# Patient Record
Sex: FEMALE | Race: BLACK OR AFRICAN AMERICAN | Employment: PART TIME | URBAN - NONMETROPOLITAN AREA
[De-identification: names, ages, dates, MRNs, and addresses within clinical notes are randomized per-mention and may not be internally consistent; named-entity substitution may affect disease eponyms.]

---

## 2019-03-12 ENCOUNTER — PROCEDURE VISIT (OUTPATIENT)
Dept: NEUROLOGY | Age: 49
End: 2019-03-12
Payer: COMMERCIAL

## 2019-03-12 DIAGNOSIS — R20.0 BILATERAL HAND NUMBNESS: Primary | ICD-10-CM

## 2019-03-12 DIAGNOSIS — M54.12 CERVICAL RADICULOPATHY: ICD-10-CM

## 2019-03-12 DIAGNOSIS — G56.03 BILATERAL CARPAL TUNNEL SYNDROME: ICD-10-CM

## 2019-03-12 PROCEDURE — 95911 NRV CNDJ TEST 9-10 STUDIES: CPT | Performed by: PSYCHIATRY & NEUROLOGY

## 2019-03-12 PROCEDURE — 95886 MUSC TEST DONE W/N TEST COMP: CPT | Performed by: PSYCHIATRY & NEUROLOGY

## 2020-07-23 ENCOUNTER — VIRTUAL VISIT (OUTPATIENT)
Dept: PSYCHIATRY | Age: 50
End: 2020-07-23
Payer: COMMERCIAL

## 2020-07-23 PROCEDURE — 99203 OFFICE O/P NEW LOW 30 MIN: CPT | Performed by: PHYSICIAN ASSISTANT

## 2020-07-23 PROCEDURE — G8427 DOCREV CUR MEDS BY ELIG CLIN: HCPCS | Performed by: PHYSICIAN ASSISTANT

## 2020-07-23 PROCEDURE — 3017F COLORECTAL CA SCREEN DOC REV: CPT | Performed by: PHYSICIAN ASSISTANT

## 2020-07-23 RX ORDER — ROSUVASTATIN CALCIUM 20 MG/1
1 TABLET, COATED ORAL DAILY
COMMUNITY
Start: 2020-06-03

## 2020-07-23 RX ORDER — CITALOPRAM 20 MG/1
1 TABLET ORAL DAILY
COMMUNITY
Start: 2020-05-27 | End: 2020-08-20 | Stop reason: SDUPTHER

## 2020-07-23 RX ORDER — IBUPROFEN 800 MG/1
1 TABLET ORAL 3 TIMES DAILY PRN
COMMUNITY
Start: 2020-07-08

## 2020-07-23 RX ORDER — AMLODIPINE BESYLATE AND BENAZEPRIL HYDROCHLORIDE 5; 10 MG/1; MG/1
1 CAPSULE ORAL DAILY
COMMUNITY
Start: 2020-06-03

## 2020-07-23 RX ORDER — HYDROXYZINE HYDROCHLORIDE 10 MG/1
1 TABLET, FILM COATED ORAL 3 TIMES DAILY PRN
COMMUNITY
Start: 2020-06-03 | End: 2020-07-23 | Stop reason: SDUPTHER

## 2020-07-23 RX ORDER — ACETAMINOPHEN/DIPHENHYDRAMINE 500MG-25MG
1 TABLET ORAL DAILY
COMMUNITY
Start: 2020-06-03 | End: 2020-08-08 | Stop reason: ALTCHOICE

## 2020-07-23 RX ORDER — HYDROXYZINE HYDROCHLORIDE 10 MG/1
TABLET, FILM COATED ORAL
Qty: 180 TABLET | Refills: 2 | Status: SHIPPED | OUTPATIENT
Start: 2020-07-23 | End: 2020-08-20 | Stop reason: SDUPTHER

## 2020-07-23 RX ORDER — MECLIZINE HYDROCHLORIDE 25 MG/1
1 TABLET ORAL DAILY PRN
COMMUNITY
Start: 2019-09-28

## 2020-07-23 NOTE — PROGRESS NOTES
[facility]  Psychiatric Assessment       CHIEF COMPLAINT:  Worsening anxiety    History obtained from:  patient, electronic medical record    HISTORY OF PRESENT ILLNESS:    Jcarlos Jason is a 47 y.o. female with significant history of anxiety and depression who presents via telehealth video as a new patient to establish care. She reports she has been seeing Radha Lexi for some years now. She states last year, two of his sisters passed away within a month. She states she was going back and forth seeing her sisters while they were in the hospital and had a mini stroke. She states she has been staying with her daughter until she can get a place. She was living with her son in an apartment but it was too stressful for her so she moved out. She has also been off work due to [employer] but is going back soon. She states the beginning of last year was when she first noticed her anxiety was a problem. She states it has gotten progressively worse the past year. She is constantly worrying about her family, especially her daughter and 4 grandchildren whom she lives with. She feels like her mind races when she is not busy. She states she started having panic attacks the beginning of last year. She reports she has experienced a lot of loss in her family and thinking about that triggers her panic attacks. She has not had one in about a month and a half. During her panic attacks, she feels that her heart is jumping like she is having a heart attack. She reports everything tightens up and when she breathes she has to be hunched over. She feels its hard to move, like her legs are sand. She reports that her panic attacks last for minutes. She states when she has a panic attack she takes a Vistaril and breathes into a bag which helps her calm down within a few minutes. She reports she has been having trouble sleeping due to her sleep apnea. She feels constantly on edge \"when I am bored. \" She has been having more irritability recently. She feels she holds a lot of tension in her neck. She also states that when she wakes up she feels that she was clenching her jaw all night. She thinks her depression really started when \"I wanted out of a relationship with my boyfriend and my son. That's when I moved in with my daughter. That been probably about last year in June. \" She feels her depression has gotten better since she has moved in with her daughter. She states she is scared to go to sleep due to her sleep apnea. She reports she gets about 4 hours a night. She has trouble staying asleep. She states she takes a few power naps throughout the day. She reports sometimes she feels rested but most of the time she is tired in the morning when she wakes up. She states her appetite has been good. She states she gained 10 pounds within a few months this year. She reports her energy and motivation is good because her grandbabies keep her going. She states her attention and concentration has been good. She denies feelings of hopelessness and helplessness. She has been seeing Arnel Purvis, her PCP, for psychotropic medication management. Is currently on Celexa 20mg and Hydroxyzine 10mg TID PRN for anxiety. Reports good medication compliance. Denies side effects. Denies past suicide attempts and inpatient psychiatric admissions.  Denies substance use      PSYCHIATRIC HISTORY:      Outpatient treatment: Arnel Purvis, PCP for psychotropic medication management  Suicide Attempts: denies  Inpatient Psychiatric Admission: denies    Past Psychiatric Meds:     Celexa  Hydroxyzine  Zoloft    Adverse reactions from psychotropic medications:      Denies      Lifetime Psychiatric Review of Systems     ·    Obsessions and Compulsions: no    ·    Grace or Hypomania: no  ·    Hallucinations: no  ·    Panic Attacks:  yes - heart racing, feels like shes having a heart attack, unable to move, trouble breathing  ·    Delusions: no  ·    Phobias: no   ·    Trauma: denies       Past Medical History:        Diagnosis Date    Asthma     Bronchitis     GERD (gastroesophageal reflux disease)     Headache     Hypertension     no treatment at present       Past Surgical History:        Procedure Laterality Date    CARPAL TUNNEL RELEASE       SECTION  3213,9924    COLONOSCOPY  2016    HYSTERECTOMY      TUBAL LIGATION      UPPER GASTROINTESTINAL ENDOSCOPY  2016    WISDOM TOOTH EXTRACTION         Current Meds    Current Outpatient Medications:     sertraline (ZOLOFT) 25 MG tablet, Take 25 mg by mouth daily, Disp: , Rfl:     lisinopril (PRINIVIL;ZESTRIL) 5 MG tablet, Take 5 mg by mouth daily, Disp: , Rfl:      Allergies:  Patient has no known allergies. Social History:     TOBACCO: Denies   ETOH:  Drinks occasionally. 1-2 times a week. DRUGS: Denies  MARITAL STATUS: Denies. Not currently in a relationship  CHILDREN: 2 children. 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 She is not currently working due to COVID-19. LEVEL OF EDUCATION: GED   RESIDENCE: Born in  [place]. Moved to [place] when she was 2. Mom and dad were  growing up. Her parents are both . She reports she has 5 biological siblings. 3 have passed away. Her brother and sister live close. She keeps in contact with both of her siblings. Currently lives with daughter and grandchildren in [place]. Previously lived with her son in [place].      Family Medical and Psychiatric History:   Denies any significant family psychiatric history  Denies any family history of substance abuse   Denies any family history of suicide attempts      Problem Relation Age of Onset    Stroke Sister     Heart Disease Sister          Mental Status Exam  Level of consciousness:  Within normal limits  Appearance: wearing street clothes, seated on bed  Behavior/Motor: no abnormalities noted  Attitude toward examiner:  Cooperative, attentive, good eye contact  Speech:  Spontaneous, normal rate and volume  Mood:  \"Good\"  Affect:  mood congruent  Thought processes:  linear, goal directed and coherent  Thought content:   Denies suicidal ideations   Denies  homicidal ideations               Denies  hallucinations   Denies delusions  Cognition:  In tact  Memory: age appropriate  Insight and judgement: fair  Medication side effects:  denies      DSM-5 DIAGNOSIS:    Panic disorder without agoraphobia  Generalized anxiety disorder  Major depressive disorder, recurrent, in partial remission      PLAN    1. Will increase Hydroxzine 10mg to 1-2 tablets TID PRN for anxiety  2. I recommended counseling for her. She states she is going to think about it and we can talk more about it during our next visit. Follow up in 4 weeks, sooner PRN    Electronically signed by Wilma Carr PA-C on 7/23/2020 at 11:34 AM Time Spent 50 minutes  I have discussed with the patient risks, benefits, side effects, and alternatives (and relevant risks, benefits, and side effects related to alternatives or not receiving care), and likelihood of the success. Patient instructed to seek emergency help via ED or calling 911 should symptoms become severe, or if thoughts to harm self or others develop. Patient stated clear understanding and is agreeable to treatment plan. Taylor Munoz is a 47 y.o. female being evaluated by a Virtual Visit (video visit) encounter to address concerns as mentioned above. A caregiver was present when appropriate. Due to this being a TeleHealth encounter (During OFYKM-05 public health emergency), evaluation of the following organ systems was limited: Vitals/Constitutional/EENT/Resp/CV/GI//MS/Neuro/Skin/Heme-Lymph-Imm.   Pursuant to the emergency declaration under the [address] waiver authority and the [employer] Act, this Virtual Visit was conducted with patient's (and/or legal guardian's) consent, to reduce the patient's risk of exposure to COVID-19 and provide necessary medical care. The patient (and/or legal guardian) has also been advised to contact this office for worsening conditions or problems, and seek emergency medical treatment and/or call 911 if deemed necessary. Patient identification was verified at the start of the visit: Yes    Total time spent for this encounter: 50 minutes    Services were provided through a video synchronous discussion virtually to substitute for in-person clinic visit. Patient and provider were located at their individual homes. --Jacqueline Maldonado PA-C on 7/23/2020 at 11:34 AM    An electronic signature was used to authenticate this note.

## 2020-07-25 NOTE — PATIENT INSTRUCTIONS
problems. Talking to others sometimes relieves stress. · Get involved in social groups, or volunteer to help others. Being alone sometimes makes things seem worse than they are. · Get at least 30 minutes of exercise on most days of the week to relieve stress. Walking is a good choice. You also may want to do other activities, such as running, swimming, cycling, or playing tennis or team sports. Relaxation techniques  Do relaxation exercises for 10 to 20 minutes a day. You can play soothing, relaxing music while you do them, if you wish. · Tell others in your house that you are going to do your relaxation exercises. Ask them not to disturb you. · Find a comfortable place, away from all distractions and noise. · Lie down on your back, or sit with your back straight. · Focus on your breathing. Make it slow and steady. · Breathe in through your nose. Breathe out through either your nose or mouth. · Breathe deeply, filling up the area between your navel and your rib cage. Breathe so that your belly goes up and down. · Do not hold your breath. · Breathe like this for 5 to 10 minutes. Notice the feeling of calmness throughout your whole body. As you continue to breathe slowly and deeply, relax by doing the following for another 5 to 10 minutes:  · Tighten and relax each muscle group in your body. You can begin at your toes and work your way up to your head. · Imagine your muscle groups relaxing and becoming heavy. · Empty your mind of all thoughts. · Let yourself relax more and more deeply. · Become aware of the state of calmness that surrounds you. · When your relaxation time is over, you can bring yourself back to alertness by moving your fingers and toes and then your hands and feet and then stretching and moving your entire body. Sometimes people fall asleep during relaxation, but they usually wake up shortly afterward.   · Always give yourself time to return to full alertness before you drive a car or

## 2020-08-08 ENCOUNTER — HOSPITAL ENCOUNTER (EMERGENCY)
Age: 50
Discharge: HOME | End: 2020-08-08
Payer: COMMERCIAL

## 2020-08-08 VITALS
RESPIRATION RATE: 20 BRPM | SYSTOLIC BLOOD PRESSURE: 167 MMHG | TEMPERATURE: 97.8 F | HEART RATE: 82 BPM | BODY MASS INDEX: 32.57 KG/M2 | DIASTOLIC BLOOD PRESSURE: 100 MMHG | HEIGHT: 62 IN | OXYGEN SATURATION: 97 % | WEIGHT: 177 LBS

## 2020-08-08 PROCEDURE — 99214 OFFICE O/P EST MOD 30 MIN: CPT | Performed by: NURSE PRACTITIONER

## 2020-08-08 PROCEDURE — U0003 INFECTIOUS AGENT DETECTION BY NUCLEIC ACID (DNA OR RNA); SEVERE ACUTE RESPIRATORY SYNDROME CORONAVIRUS 2 (SARS-COV-2) (CORONAVIRUS DISEASE [COVID-19]), AMPLIFIED PROBE TECHNIQUE, MAKING USE OF HIGH THROUGHPUT TECHNOLOGIES AS DESCRIBED BY CMS-2020-01-R: HCPCS

## 2020-08-08 PROCEDURE — 99203 OFFICE O/P NEW LOW 30 MIN: CPT

## 2020-08-08 RX ORDER — AZITHROMYCIN 250 MG/1
TABLET, FILM COATED ORAL
Qty: 6 TABLET | Refills: 0 | Status: SHIPPED | OUTPATIENT
Start: 2020-08-08

## 2020-08-08 RX ORDER — PREDNISONE 20 MG/1
20 TABLET ORAL 2 TIMES DAILY
Qty: 10 TABLET | Refills: 0 | Status: SHIPPED | OUTPATIENT
Start: 2020-08-08 | End: 2020-08-13

## 2020-08-08 RX ORDER — ALBUTEROL SULFATE 1.25 MG/3ML
1 SOLUTION RESPIRATORY (INHALATION) EVERY 6 HOURS PRN
COMMUNITY

## 2020-08-08 ASSESSMENT — ENCOUNTER SYMPTOMS
NAUSEA: 0
CHEST TIGHTNESS: 1
EYE PAIN: 0
VOMITING: 0
SORE THROAT: 0
RHINORRHEA: 0
WHEEZING: 0
CONSTIPATION: 0
BACK PAIN: 0
EYE REDNESS: 0
TROUBLE SWALLOWING: 0
EYE DISCHARGE: 0
SHORTNESS OF BREATH: 1
ABDOMINAL PAIN: 0
COUGH: 0
ALLERGIC/IMMUNOLOGIC NEGATIVE: 1
DIARRHEA: 0

## 2020-08-08 NOTE — LETTER
[facility]  [address] [facility] [address]  Phone: [phone]    No name on file. August 8, 2020     Patient: Pelon Davenport   YOB: 1970   Date of Visit: 8/8/2020       To Whom it May Concern:    Pelon Davenport was seen in my clinic on 8/8/2020. She may return to work on August 10, 2020. If you have any questions or concerns, please don't hesitate to call.     Sincerely,

## 2020-08-08 NOTE — ED TRIAGE NOTES
To room with c/o runny nose and feeling short of breath. HX of asthma.  \"I feel like I cant cough\"

## 2020-08-08 NOTE — ED PROVIDER NOTES
[facility]  Urgent Care Encounter      CHIEF COMPLAINT       Chief Complaint   Patient presents with    Shortness of Breath       Nurses Notes reviewed and I agree except as noted in the HPI. HISTORY OF PRESENT ILLNESS   Claudette Barrera is a 47 y.o. female who presents with 3 days of ongoing shortness of breath, chest tightness, fatigue and poor appetite. Denies fever, sore throat, otalgia, vomiting but does have loose stools. Non-smoker with history of asthma. REVIEW OF SYSTEMS     Review of Systems   Constitutional: Positive for appetite change and fatigue. Negative for activity change and fever. HENT: Negative for congestion, ear pain, rhinorrhea, sore throat and trouble swallowing. Eyes: Negative for pain, discharge and redness. Respiratory: Positive for chest tightness and shortness of breath. Negative for cough and wheezing. Cardiovascular: Negative. Gastrointestinal: Negative for abdominal pain, constipation, diarrhea, nausea and vomiting. Endocrine: Negative. Genitourinary: Negative for dysuria, frequency and urgency. Musculoskeletal: Negative for arthralgias, back pain and myalgias. Skin: Negative for rash. Allergic/Immunologic: Negative. Neurological: Negative for dizziness, tremors, weakness and headaches. Hematological: Negative. Psychiatric/Behavioral: Negative for dysphoric mood and sleep disturbance. The patient is not nervous/anxious. PAST MEDICAL HISTORY         Diagnosis Date    Asthma     Bronchitis     GERD (gastroesophageal reflux disease)     Headache     Hypertension     no treatment at present       SURGICAL HISTORY     Patient  has a past surgical history that includes Hysterectomy; [place] tooth extraction; Carpal tunnel release; Tubal ligation;  section (7004,1213); Colonoscopy (2016); Upper gastrointestinal endoscopy (2016); and Insertable Cardiac Monitor.     CURRENT MEDICATIONS       Previous Medications    ALBUTEROL (ACCUNEB) 1.25 MG/3ML NEBULIZER SOLUTION    Inhale 1 ampule into the lungs every 6 hours as needed for Wheezing    AMLODIPINE-BENAZEPRIL (LOTREL) 5-10 MG PER CAPSULE    Take 1 capsule by mouth daily    CITALOPRAM (CELEXA) 20 MG TABLET    Take 1 tablet by mouth daily    HYDROXYZINE (ATARAX) 10 MG TABLET    Take 1-2 tablets three times a day as needed for anxiety    IBUPROFEN (ADVIL;MOTRIN) 800 MG TABLET    Take 1 tablet by mouth 3 times daily as needed With food or milk    LISINOPRIL PO    Take by mouth    MECLIZINE (ANTIVERT) 25 MG TABLET    Take 1 tablet by mouth daily as needed    ROSUVASTATIN (CRESTOR) 20 MG TABLET    Take 1 tablet by mouth daily       ALLERGIES     Patient is is allergic to penicillins. FAMILY HISTORY     Patient'sfamily history includes Heart Disease in her sister; Stroke in her sister. SOCIAL HISTORY     Patient  reports that she has never smoked. She has never used smokeless tobacco. She reports current alcohol use. She reports that she does not use drugs. PHYSICAL EXAM     ED TRIAGE VITALS  BP: (S) (!) 167/100, Temp: 97.8 °F (36.6 °C),  , Resp: 20, SpO2: 97 %  Physical Exam  Vitals signs reviewed. Constitutional:       General: She is not in acute distress. Appearance: Normal appearance. She is well-developed. She is not toxic-appearing or diaphoretic. HENT:      Head: Normocephalic. No right periorbital erythema or left periorbital erythema. Jaw: No trismus. Right Ear: Hearing, tympanic membrane, ear canal and external ear normal.      Left Ear: Hearing, tympanic membrane, ear canal and external ear normal.      Nose: Nose normal. No mucosal edema or rhinorrhea. Mouth/Throat:      Mouth: Mucous membranes are moist.      Dentition: Normal dentition. Pharynx: Uvula midline. No posterior oropharyngeal erythema. Tonsils: No tonsillar exudate. 0 on the right. 0 on the left.    Eyes:      General: Lids are normal.         Right

## 2020-08-10 ENCOUNTER — CARE COORDINATION (OUTPATIENT)
Dept: CARE COORDINATION | Age: 50
End: 2020-08-10

## 2020-08-10 NOTE — CARE COORDINATION
Attempted to call the patient for a covid precaution call.  Bad number      [facility]  [address] [facility]   Medication Assistance  [facility] and [employer]    L)[phone] (P)[phone]

## 2020-08-11 LAB — SARS-COV-2: NOT DETECTED

## 2020-08-11 NOTE — CARE COORDINATION
Second attempt in trying to reach the patient, still bad number. I will close the encounter and end the episode.

## 2020-08-20 ENCOUNTER — VIRTUAL VISIT (OUTPATIENT)
Dept: PSYCHIATRY | Age: 50
End: 2020-08-20
Payer: COMMERCIAL

## 2020-08-20 PROCEDURE — G8428 CUR MEDS NOT DOCUMENT: HCPCS | Performed by: PHYSICIAN ASSISTANT

## 2020-08-20 PROCEDURE — 3017F COLORECTAL CA SCREEN DOC REV: CPT | Performed by: PHYSICIAN ASSISTANT

## 2020-08-20 PROCEDURE — 99213 OFFICE O/P EST LOW 20 MIN: CPT | Performed by: PHYSICIAN ASSISTANT

## 2020-08-20 PROCEDURE — 90833 PSYTX W PT W E/M 30 MIN: CPT | Performed by: PHYSICIAN ASSISTANT

## 2020-08-20 RX ORDER — HYDROXYZINE HYDROCHLORIDE 10 MG/1
TABLET, FILM COATED ORAL
Qty: 180 TABLET | Refills: 3 | Status: SHIPPED | OUTPATIENT
Start: 2020-08-20

## 2020-08-20 RX ORDER — CITALOPRAM 20 MG/1
20 TABLET ORAL DAILY
Qty: 30 TABLET | Refills: 3 | Status: SHIPPED | OUTPATIENT
Start: 2020-08-20

## 2020-08-20 NOTE — PROGRESS NOTES
evidence of delusions  Perceptual Disturbance:  denies any perceptual disturbance  Cognition:  In tact  Memory: age appropriate  Insight & Judgement: fair  Medication side effects:  denies       Medications       Current Outpatient Medications:     LISINOPRIL PO, Take by mouth, Disp: , Rfl:     albuterol (ACCUNEB) 1.25 MG/3ML nebulizer solution, Inhale 1 ampule into the lungs every 6 hours as needed for Wheezing, Disp: , Rfl:     azithromycin (ZITHROMAX Z-TOAN) 250 MG tablet, 2 tablets day 1 then1 tablet days 2 - 5., Disp: 6 tablet, Rfl: 0    amLODIPine-benazepril (LOTREL) 5-10 MG per capsule, Take 1 capsule by mouth daily, Disp: , Rfl:     citalopram (CELEXA) 20 MG tablet, Take 1 tablet by mouth daily, Disp: , Rfl:     ibuprofen (ADVIL;MOTRIN) 800 MG tablet, Take 1 tablet by mouth 3 times daily as needed With food or milk, Disp: , Rfl:     meclizine (ANTIVERT) 25 MG tablet, Take 1 tablet by mouth daily as needed, Disp: , Rfl:     rosuvastatin (CRESTOR) 20 MG tablet, Take 1 tablet by mouth daily, Disp: , Rfl:     hydrOXYzine (ATARAX) 10 MG tablet, Take 1-2 tablets three times a day as needed for anxiety, Disp: 180 tablet, Rfl: 2       ASSESSMENT     Generalized anxiety disorder  Major depressive disorder, recurrent, in partial remission  Panic disorder without agoraphobia    PLAN   1. Will refill Hydroxyzine 10mg 1-2 tablets TID PRN for 30 days, with 3 refills  2. Will refill Celexa 20mg for 30 days, with 3 refills. Follow up in 3 months, sooner PRN    Electronically signed by Chris Valencia PA-C on 8/20/2020 at 2:46 PM    More than 16 mins of the session was spent doing Supportive psychotherapy. Session lasted for 30 mins. Terry Dean is a 47 y.o. female being evaluated by a Virtual Visit (video visit) encounter to address concerns as mentioned above. A caregiver was present when appropriate.  Due to this being a TeleHealth encounter (During [facility]- public health emergency), evaluation of the following organ systems was limited: Vitals/Constitutional/EENT/Resp/CV/GI//MS/Neuro/Skin/Heme-Lymph-Imm. Pursuant to the emergency declaration under the [address] [place] [address], [address] [place] [address] authority and the [employer] Act, this Virtual Visit was conducted with patient's (and/or legal guardian's) consent, to reduce the patient's risk of exposure to COVID-19 and provide necessary medical care. The patient (and/or legal guardian) has also been advised to contact this office for worsening conditions or problems, and seek emergency medical treatment and/or call 911 if deemed necessary. Patient identification was verified at the start of the visit: Yes    Total time spent for this encounter: 30 minutes    Services were provided through a video synchronous discussion virtually to substitute for in-person clinic visit. Patient and provider were located at their individual homes. --[facility]Sinai Medical CenterGERRY on 8/20/2020 at 2:46 PM    An electronic signature was used to authenticate this note.

## 2020-11-20 ENCOUNTER — VIRTUAL VISIT (OUTPATIENT)
Dept: PSYCHIATRY | Age: 50
End: 2020-11-20
Payer: COMMERCIAL

## 2020-11-20 PROCEDURE — 3017F COLORECTAL CA SCREEN DOC REV: CPT | Performed by: PHYSICIAN ASSISTANT

## 2020-11-20 PROCEDURE — 99213 OFFICE O/P EST LOW 20 MIN: CPT | Performed by: PHYSICIAN ASSISTANT

## 2020-11-20 PROCEDURE — G8428 CUR MEDS NOT DOCUMENT: HCPCS | Performed by: PHYSICIAN ASSISTANT

## 2020-11-20 PROCEDURE — 90833 PSYTX W PT W E/M 30 MIN: CPT | Performed by: PHYSICIAN ASSISTANT

## 2020-11-20 RX ORDER — BUSPIRONE HYDROCHLORIDE 5 MG/1
5 TABLET ORAL 2 TIMES DAILY
Qty: 60 TABLET | Refills: 1 | Status: SHIPPED | OUTPATIENT
Start: 2020-11-20 | End: 2021-01-19

## 2020-11-20 NOTE — PROGRESS NOTES
[facility]   Progress Note     Chief Complaint   Patient presents with    Anxiety    Depression    Follow-up          SUBJECTIVE:    Carrington Montgomery is a 45 y.o. female who presents via telehealth video for a follow-up appointment for anxiety and depression. Was last seen on 08/20/2020    She states she is doing \"good\" today  She reports she has been doing okay since our last visit  Her anxiety has been okay overall. She states it was her sister's anniversary last month and she has been thinking about stuff. She feels like it has been making her heart race. She states she went to the car wash the other day and \"allmost had an attack\" because it scared her. She reports she has been having panic attacks about once a week. \"Either Im excited or feeling hurt. My grandbabies could be behind me and scare me. \" She feels she has been easily startled the last two months. When asked why she feels that way, \"Cause it be so boring and I don't do nothing. I go to work and have not been stopping at my brother's. \"   She feels the Hydroxyzine helps with her anxiety but \"it make me feel real drowsy and I don't want to do nothing. It make me sleep. \" She reports she takes it when she feels she is having an urgency or right before she goes to sleep. She feels she has been able to sleep better with the Hydroxyzine. I discussed with her about adding Buspar for her anxiety as it is less sedating. She agreed to the plan and would prefer to take it 2x daily compared to 3. She has been able to fall and stay asleep. She states she is supposed to sleep with a CPAP and they turned the pressure down but she has not been using it because she is afraid of sleeping with the mask. She states her energy has been \"cool\" throughout the day. Her irritability has been better and she has been trying to ignore stuff that makes her mad. She feels her depression has been okay.  She states it has been hard adjusting to not having her sisters around. She states she has good family support and her brother to talk to when she is missing her sisters. She is still living with her daughter. She states it has been good but her grandbabies have been driving her crazy. States the Celexa continues to help with her depression. Appetite has been good. She feels she is really hungry at night   She states she is really happy to working almost every day. She loves what she does at [employer]. 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    Denies current and recent suicidal ideation  Denies hallucinations  No evidence of delusions or overt psychosis on examination    OBJECTIVE  Level of consciousness:  Within normal limits  Appearance: Street clothes, standing, with good grooming  Behavior/Motor: No abnormalities noted  Attitude toward examiner:  Cooperative, attentive, good eye contact  Speech:  Normal rate and volume   Mood:  \"good\"   Affect:  reactive  Thought processes:  linear, goal directed and coherent  Thought content:  denies homicidal ideation  Suicidal Ideation:  denies suicidal ideation  Delusions:  no evidence of delusions  Perceptual Disturbance:  denies any perceptual disturbance  Cognition:  In tact  Memory: age appropriate  Insight & Judgement: fair  Medication side effects:  denies       Medications       Current Outpatient Medications:     busPIRone (BUSPAR) 5 MG tablet, Take 1 tablet by mouth 2 times daily, Disp: 60 tablet, Rfl: 1    citalopram (CELEXA) 20 MG tablet, Take 1 tablet by mouth daily, Disp: 30 tablet, Rfl: 3    hydrOXYzine (ATARAX) 10 MG tablet, Take 1-2 tablets three times a day as needed for anxiety, Disp: 180 tablet, Rfl: 3    LISINOPRIL PO, Take by mouth, Disp: , Rfl:     albuterol (ACCUNEB) 1.25 MG/3ML nebulizer solution, Inhale 1 ampule into the lungs every 6 hours as needed for Wheezing, Disp: , Rfl:     azithromycin (ZITHROMAX Z-TOAN) 250 MG tablet, 2 tablets day 1 then1 tablet days 2 - 5., Disp: 6 tablet, Rfl: 0    amLODIPine-benazepril (LOTREL) 5-10 MG per capsule, Take 1 capsule by mouth daily, Disp: , Rfl:     ibuprofen (ADVIL;MOTRIN) 800 MG tablet, Take 1 tablet by mouth 3 times daily as needed With food or milk, Disp: , Rfl:     meclizine (ANTIVERT) 25 MG tablet, Take 1 tablet by mouth daily as needed, Disp: , Rfl:     rosuvastatin (CRESTOR) 20 MG tablet, Take 1 tablet by mouth daily, Disp: , Rfl:        ASSESSMENT     Generalized anxiety disorder  Major depressive disorder, recurrent, in partial remission  Panic disorder without agoraphobia    PLAN   1. Will add Buspar 5mg BID for anxiety. Will prescribe 60 tablets for a 30 day supply with 1 refill. Follow up 4 weeks, sooner PRN    Electronically signed by Sunitha Talavera PA-C on 11/23/2020 at 11:04 PM    More than 16 mins of the session was spent doing Supportive psychotherapy. Session lasted for 30 mins. Dora Dotson is a 45 y.o. female being evaluated by a Virtual Visit (video visit) encounter to address concerns as mentioned above. A caregiver was present when appropriate. Due to this being a TeleHealth encounter (During XWP-85 public health emergency), evaluation of the following organ systems was limited: Vitals/Constitutional/EENT/Resp/CV/GI//MS/Neuro/Skin/Heme-Lymph-Imm. Pursuant to the emergency declaration under the [address] [place] [address] and the [employer] and [employer] Act, this Virtual Visit was conducted with patient's (and/or legal guardian's) consent, to reduce the patient's risk of exposure to COVID-19 and provide necessary medical care. The patient (and/or legal guardian) has also been advised to contact this office for worsening conditions or problems, and seek emergency medical treatment and/or call 911 if deemed necessary.      Patient identification was verified at the start of the visit: Yes    Total time spent for this encounter: 30 minutes    Services were provided through a video synchronous discussion virtually to substitute for in-person clinic visit. Patient and provider were located at their individual homes. --Richie Catherine PA-C on 11/23/2020 at 11:07 PM    An electronic signature was used to authenticate this note.

## 2020-12-16 ENCOUNTER — VIRTUAL VISIT (OUTPATIENT)
Dept: PSYCHIATRY | Age: 50
End: 2020-12-16
Payer: COMMERCIAL

## 2020-12-16 PROCEDURE — 90833 PSYTX W PT W E/M 30 MIN: CPT | Performed by: PHYSICIAN ASSISTANT

## 2020-12-16 PROCEDURE — 99213 OFFICE O/P EST LOW 20 MIN: CPT | Performed by: PHYSICIAN ASSISTANT

## 2020-12-16 PROCEDURE — G8428 CUR MEDS NOT DOCUMENT: HCPCS | Performed by: PHYSICIAN ASSISTANT

## 2020-12-16 PROCEDURE — 3017F COLORECTAL CA SCREEN DOC REV: CPT | Performed by: PHYSICIAN ASSISTANT

## 2020-12-16 NOTE — PROGRESS NOTES
[facility]   Progress Note     Chief Complaint   Patient presents with    Anxiety    Depression    Follow-up          SUBJECTIVE:    Zaria Tomas is a 45 y.o. female who presents via telehealth video for a follow-up appointment for anxiety and depression. Was last seen on 11/20/2020     She states she is doing \"fine\" today  She reports she has been doing okay since our last visit  Her anxiety has been good. She has still been feeling anxious but as soon as she takes her medication she feels better. She reports she has been having big panic attacks recently. She states she went through a different car wash and it started closing in. She felt like she was trapped. She also states she is scared when someone comes behind her and scares her. She denies any other triggers. Unsure if these are true panic attacks or not. She has not been sleeping good. She has been tossing and turning. She states she is supposed to sleep with a CPAP but she has not been using it because she is afraid of sleeping with the mask. She feels rested in the morning when she wakes up. She states her energy has been good throughout the day. Her irritability has been better. She states she is able to handle things that make her mad. She feels her depression has been okay. She has been missing her sisters when she is bored and with it being around the holidays. She feels she has been crying more because of it. She states she has good family support and her siblings to talk to when she is missing her sisters. She is still living with her daughter. She states it has been good   States the Celexa continues to help with her depression but she does not take them everyday like she is supposed to. Appetite has been good. She feels she is really hungry at night. She states she is really happy to working almost every day. She loves what she does at [employer]. She reports they have been more busy with take outs during [facility]. Denies current and recent suicidal ideation  Denies hallucinations  No evidence of delusions or overt psychosis on examination    OBJECTIVE  Level of consciousness:  Within normal limits  Appearance: Street clothes, seated on couch, with good grooming  Behavior/Motor: No abnormalities noted  Attitude toward examiner:  Cooperative, attentive, good eye contact  Speech:  Normal rate and volume  Mood:   \"Fine\"   Affect:  mood congruent  Thought processes:  linear, goal directed and coherent  Thought content:  denies homicidal ideation  Suicidal Ideation:  denies suicidal ideation  Delusions:  no evidence of delusions  Perceptual Disturbance:  denies any perceptual disturbance  Cognition:  In tact  Memory: age appropriate  Insight & Judgement: fair  Medication side effects:  denies       Medications       Current Outpatient Medications:     busPIRone (BUSPAR) 5 MG tablet, Take 1 tablet by mouth 2 times daily, Disp: 60 tablet, Rfl: 1    citalopram (CELEXA) 20 MG tablet, Take 1 tablet by mouth daily, Disp: 30 tablet, Rfl: 3    hydrOXYzine (ATARAX) 10 MG tablet, Take 1-2 tablets three times a day as needed for anxiety, Disp: 180 tablet, Rfl: 3    LISINOPRIL PO, Take by mouth, Disp: , Rfl:     albuterol (ACCUNEB) 1.25 MG/3ML nebulizer solution, Inhale 1 ampule into the lungs every 6 hours as needed for Wheezing, Disp: , Rfl:     azithromycin (ZITHROMAX Z-TOAN) 250 MG tablet, 2 tablets day 1 then1 tablet days 2 - 5., Disp: 6 tablet, Rfl: 0    amLODIPine-benazepril (LOTREL) 5-10 MG per capsule, Take 1 capsule by mouth daily, Disp: , Rfl:     ibuprofen (ADVIL;MOTRIN) 800 MG tablet, Take 1 tablet by mouth 3 times daily as needed With food or milk, Disp: , Rfl:     meclizine (ANTIVERT) 25 MG tablet, Take 1 tablet by mouth daily as needed, Disp: , Rfl:   rosuvastatin (CRESTOR) 20 MG tablet, Take 1 tablet by mouth daily, Disp: , Rfl:        ASSESSMENT     Generalized anxiety disorder  Major depressive disorder, recurrent, in partial remission      PLAN   1. Will continue same medications as prescribed  Follow up 4 weeks, sooner PRN    Electronically signed by Denisse Kerr PA-C on 12/24/2020 at 2:52 PM    More than 16 mins of the session was spent doing Supportive psychotherapy. Session lasted for 30 mins. Iveth Wallace is a 45 y.o. female being evaluated by a Virtual Visit (video visit) encounter to address concerns as mentioned above. A caregiver was present when appropriate. Due to this being a TeleHealth encounter (During ZYROG-69 public health emergency), evaluation of the following organ systems was limited: Vitals/Constitutional/EENT/Resp/CV/GI//MS/Neuro/Skin/Heme-Lymph-Imm. Pursuant to the emergency declaration under the [address] [place] [address], [address] [place] [address] authority and the [employer] and [employer] Act, this Virtual Visit was conducted with patient's (and/or legal guardian's) consent, to reduce the patient's risk of exposure to COVID-19 and provide necessary medical care. The patient (and/or legal guardian) has also been advised to contact this office for worsening conditions or problems, and seek emergency medical treatment and/or call 911 if deemed necessary. Patient identification was verified at the start of the visit: Yes    Total time spent for this encounter: 30 minutes    Services were provided through a video synchronous discussion virtually to substitute for in-person clinic visit. Patient and provider were located at their individual homes. --Denisse Kerr PA-C on 12/24/2020 at 2:52 PM    An electronic signature was used to authenticate this note.

## 2020-12-24 PROBLEM — F41.1 GENERALIZED ANXIETY DISORDER: Status: ACTIVE | Noted: 2020-12-24

## 2024-04-05 ENCOUNTER — HOSPITAL ENCOUNTER (EMERGENCY)
Age: 54
Discharge: HOME | End: 2024-04-05
Payer: COMMERCIAL

## 2024-04-05 VITALS
WEIGHT: 152 LBS | OXYGEN SATURATION: 97 % | RESPIRATION RATE: 16 BRPM | HEART RATE: 71 BPM | TEMPERATURE: 97.8 F | BODY MASS INDEX: 28.7 KG/M2 | HEIGHT: 61 IN | DIASTOLIC BLOOD PRESSURE: 67 MMHG | SYSTOLIC BLOOD PRESSURE: 122 MMHG

## 2024-04-05 DIAGNOSIS — J45.901 EXACERBATION OF ASTHMA, UNSPECIFIED ASTHMA SEVERITY, UNSPECIFIED WHETHER PERSISTENT: Primary | ICD-10-CM

## 2024-04-05 DIAGNOSIS — J20.9 ACUTE BRONCHITIS, UNSPECIFIED ORGANISM: ICD-10-CM

## 2024-04-05 PROCEDURE — 6370000000 HC RX 637 (ALT 250 FOR IP)

## 2024-04-05 PROCEDURE — 2580000003 HC RX 258

## 2024-04-05 PROCEDURE — 94640 AIRWAY INHALATION TREATMENT: CPT

## 2024-04-05 PROCEDURE — 99213 OFFICE O/P EST LOW 20 MIN: CPT

## 2024-04-05 PROCEDURE — 96372 THER/PROPH/DIAG INJ SC/IM: CPT

## 2024-04-05 PROCEDURE — 6360000002 HC RX W HCPCS

## 2024-04-05 PROCEDURE — 99204 OFFICE O/P NEW MOD 45 MIN: CPT

## 2024-04-05 RX ORDER — BENZONATATE 100 MG/1
100 CAPSULE ORAL 3 TIMES DAILY PRN
Qty: 30 CAPSULE | Refills: 0 | Status: SHIPPED | OUTPATIENT
Start: 2024-04-05

## 2024-04-05 RX ORDER — IPRATROPIUM BROMIDE AND ALBUTEROL SULFATE 2.5; .5 MG/3ML; MG/3ML
1 SOLUTION RESPIRATORY (INHALATION) ONCE
Status: COMPLETED | OUTPATIENT
Start: 2024-04-05 | End: 2024-04-05

## 2024-04-05 RX ORDER — BENZONATATE 100 MG/1
100 CAPSULE ORAL ONCE
Status: DISCONTINUED | OUTPATIENT
Start: 2024-04-05 | End: 2024-04-05

## 2024-04-05 RX ORDER — BENZONATATE 100 MG/1
100 CAPSULE ORAL 3 TIMES DAILY PRN
COMMUNITY
End: 2024-04-05

## 2024-04-05 RX ORDER — PREDNISONE 20 MG/1
20 TABLET ORAL 2 TIMES DAILY
Qty: 10 TABLET | Refills: 0 | Status: SHIPPED | OUTPATIENT
Start: 2024-04-05 | End: 2024-04-10

## 2024-04-05 RX ORDER — AZITHROMYCIN 250 MG/1
TABLET, FILM COATED ORAL
Qty: 1 PACKET | Refills: 0 | Status: SHIPPED | OUTPATIENT
Start: 2024-04-05 | End: 2024-04-09

## 2024-04-05 RX ADMIN — WATER 125 MG: 1 INJECTION INTRAMUSCULAR; INTRAVENOUS; SUBCUTANEOUS at 16:01

## 2024-04-05 RX ADMIN — IPRATROPIUM BROMIDE AND ALBUTEROL SULFATE 1 DOSE: .5; 3 SOLUTION RESPIRATORY (INHALATION) at 16:02

## 2024-04-05 ASSESSMENT — ENCOUNTER SYMPTOMS
COUGH: 1
WHEEZING: 1

## 2024-04-05 ASSESSMENT — PAIN - FUNCTIONAL ASSESSMENT: PAIN_FUNCTIONAL_ASSESSMENT: NONE - DENIES PAIN

## 2024-04-05 NOTE — ED PROVIDER NOTES
[facility] URGENT CARE  Urgent Care Encounter       CHIEF COMPLAINT       Chief Complaint   Patient presents with    Cough       Nurses Notes reviewed and I agree except as noted in the HPI.  HISTORY OF PRESENT ILLNESS   Rafael Gongora is a 53 y.o. female who presents with complaints of cough and chest congestion that started 3 days ago. Pt reports history of asthma and bronchitis. Pt denies any shortness of breath, fevers, body aches or fatigue.     The history is provided by the patient.       REVIEW OF SYSTEMS     Review of Systems   HENT:  Positive for congestion.    Respiratory:  Positive for cough and wheezing.    All other systems reviewed and are negative.      PAST MEDICAL HISTORY         Diagnosis Date    Asthma     Bronchitis     GERD (gastroesophageal reflux disease)     Headache     Hypertension     no treatment at present    Palpitations     TIA (transient ischemic attack)        SURGICALHISTORY     Patient  has a past surgical history that includes Hysterectomy; [place] tooth extraction; Carpal tunnel release; Tubal ligation;  section (,); Colonoscopy (); Upper gastrointestinal endoscopy (); and Insertable Cardiac Monitor.    CURRENT MEDICATIONS       Previous Medications    ALBUTEROL (ACCUNEB) 1.25 MG/3ML NEBULIZER SOLUTION    Inhale 3 mLs into the lungs every 6 hours as needed for Wheezing    ASPIRIN LOW DOSE 81 MG EC TABLET    Take 1 tablet by mouth daily    BENAZEPRIL (LOTENSIN) 10 MG TABLET    Take 1 tablet by mouth daily    BISACODYL (DULCOLAX) 5 MG EC TABLET    Take 1 tablet by mouth daily as needed for Constipation    CALCIUM CARBONATE 1500 (600 CA) MG TABS TABLET    Take 1 tablet by mouth 2 times daily (with meals)    DOCUSATE SODIUM (COLACE) 100 MG CAPSULE    Take 1 capsule by mouth 2 times daily    FLUOXETINE (PROZAC) 10 MG CAPSULE    take 1 capsule by mouth once daily WITH 20 MG    FLUOXETINE (PROZAC) 20 MG CAPSULE    Take 1 capsule by mouth daily

## 2024-05-13 ENCOUNTER — APPOINTMENT (OUTPATIENT)
Dept: GENERAL RADIOLOGY | Age: 54
End: 2024-05-13
Payer: COMMERCIAL

## 2024-05-13 ENCOUNTER — HOSPITAL ENCOUNTER (EMERGENCY)
Age: 54
Discharge: HOME | End: 2024-05-13
Payer: COMMERCIAL

## 2024-05-13 VITALS
RESPIRATION RATE: 20 BRPM | OXYGEN SATURATION: 100 % | SYSTOLIC BLOOD PRESSURE: 121 MMHG | DIASTOLIC BLOOD PRESSURE: 60 MMHG | TEMPERATURE: 98 F | HEART RATE: 75 BPM

## 2024-05-13 DIAGNOSIS — S66.912A STRAIN OF LEFT WRIST, INITIAL ENCOUNTER: Primary | ICD-10-CM

## 2024-05-13 PROCEDURE — 99213 OFFICE O/P EST LOW 20 MIN: CPT | Performed by: NURSE PRACTITIONER

## 2024-05-13 PROCEDURE — 73110 X-RAY EXAM OF WRIST: CPT

## 2024-05-13 PROCEDURE — 99213 OFFICE O/P EST LOW 20 MIN: CPT

## 2024-05-13 RX ORDER — KETOROLAC TROMETHAMINE 10 MG/1
10 TABLET, FILM COATED ORAL EVERY 8 HOURS PRN
Qty: 15 TABLET | Refills: 0 | Status: SHIPPED | OUTPATIENT
Start: 2024-05-13

## 2024-05-13 RX ORDER — CYCLOBENZAPRINE HCL 10 MG
10 TABLET ORAL 2 TIMES DAILY PRN
Qty: 20 TABLET | Refills: 0 | Status: SHIPPED | OUTPATIENT
Start: 2024-05-13 | End: 2024-05-23

## 2024-05-13 ASSESSMENT — ENCOUNTER SYMPTOMS
CHOKING: 0
CHEST TIGHTNESS: 0
WHEEZING: 0
BACK PAIN: 0
COUGH: 0
SHORTNESS OF BREATH: 0
STRIDOR: 0

## 2024-05-13 NOTE — ED PROVIDER NOTES
[facility] URGENT CARE  Urgent Care Encounter      CHIEF COMPLAINT       Chief Complaint   Patient presents with    Wrist Injury     left       Nurses Notes reviewed and I agree except as noted in the HPI.  HISTORY OFPRESENT ILLNESS   Rafael Gongora is a 53 y.o.  The history is provided by the patient. No  was used.   Wrist Problem  Location:  Wrist, hand and finger  Wrist location:  L wrist  Hand location:  [place] of L hand, L palm and L hand  Injury: yes    Mechanism of injury: fall    Fall:     Impact surface:  [place]    Point of impact:  Outstretched arms and hands  Pain details:     Quality:  Pressure and cramping    Radiates to:  L wrist and L fingers    Severity:  Moderate    Onset quality:  Gradual    Duration:  2 days    Timing:  Constant    Progression:  Worsening  Handedness:  Right-handed  Dislocation: no    Foreign body present:  No foreign bodies  Tetanus status:  Unknown  Prior injury to area:  No  Relieved by:  Nothing  Worsened by:  Bearing weight, exercise, movement, stress and stretching area  Ineffective treatments:  Ice and NSAIDs  Associated symptoms: stiffness and swelling    Associated symptoms: no back pain, no decreased range of motion, no fatigue, no fever, no muscle weakness, no neck pain, no numbness and no tingling    Risk factors: no concern for non-accidental trauma, no known bone disorder, no frequent fractures and no recent illness        REVIEW OF SYSTEMS     Review of Systems   Constitutional:  Negative for fatigue and fever.   Respiratory:  Negative for apnea, cough, choking, chest tightness, shortness of breath, wheezing and stridor.    Cardiovascular:  Negative for chest pain, palpitations and leg swelling.   Musculoskeletal:  Positive for joint swelling, myalgias and stiffness. Negative for back pain and neck pain.       PAST MEDICAL HISTORY         Diagnosis Date    Asthma     Bronchitis     GERD (gastroesophageal reflux disease)      tablet Take 1 tablet by mouth dailyHistorical Med      bisacodyl (DULCOLAX) 5 MG EC tablet Take 1 tablet by mouth daily as needed for ConstipationHistorical Med      albuterol (ACCUNEB) 1.25 MG/3ML nebulizer solution Inhale 3 mLs into the lungs every 6 hours as needed for WheezingHistorical Med      meclizine (ANTIVERT) 25 MG tablet Take 1 tablet by mouth daily as neededHistorical Med      rosuvastatin (CRESTOR) 20 MG tablet Take 1 tablet by mouth dailyHistorical Med       !! - Potential duplicate medications found. Please discuss with provider.          ALLERGIES     Patient is is allergic to penicillins.    FAMILY HISTORY     Patient's family history includes Heart Disease in her sister; Stroke in her sister.    SOCIAL HISTORY     Patient  reports that she has never smoked. She has never used smokeless tobacco. She reports current alcohol use. She reports that she does not use drugs.    PHYSICAL EXAM     ED TRIAGE VITALS  BP: 121/60, Temp: 98 °F (36.7 °C), Pulse: 75, Respirations: 20, SpO2: 100 %  Physical Exam  Vitals and nursing note reviewed.   Constitutional:       Appearance: Normal appearance.   HENT:      Head: Normocephalic and atraumatic.      Mouth/Throat:      Mouth: Mucous membranes are moist.   Eyes:      Extraocular Movements: Extraocular movements intact.      Conjunctiva/sclera: Conjunctivae normal.   Cardiovascular:      Pulses: Normal pulses.   Pulmonary:      Effort: Pulmonary effort is normal.   Musculoskeletal:         General: Swelling and tenderness present.      Left wrist: Swelling and tenderness present.      Left hand: Swelling and tenderness present. Decreased strength of finger abduction and wrist extension. Normal sensation.      Cervical back: Normal range of motion.   Skin:     General: Skin is warm and dry.   Neurological:      General: No focal deficit present.      Mental Status: She is alert and oriented to person, place, and time.   Psychiatric:         Mood and Affect: Mood

## 2024-05-13 NOTE — DISCHARGE INSTRUCTIONS
Compression with Wrap/brace as needed for support.  Ice, elevation 15-20 minutes 3 times a day for the first 24-48 hours followed with heat 15-20 minutes 3 times a day thereafter.    Activity as tolerated.    Take medication as directed  Return for worsening symptoms, otherwise if symptoms persist follow up orthopedics in 1 to 3 days

## 2024-05-13 NOTE — ED PROVIDER NOTES
[facility] URGENT CARE  Urgent Care Encounter       CHIEF COMPLAINT       Chief Complaint   Patient presents with    Wrist Injury     left       Nurses Notes reviewed and I agree except as noted in the HPI.  HISTORY OF PRESENT ILLNESS   Rafael Gongora is a 53 y.o. female who presents to Urgent Care with left wrist injury    The history is provided by the patient. No  was used.   Wrist Injury        REVIEW OF SYSTEMS     Review of Systems    PAST MEDICAL HISTORY         Diagnosis Date    Asthma     Bronchitis     GERD (gastroesophageal reflux disease)     Headache     Hypertension     no treatment at present    Palpitations     TIA (transient ischemic attack)        SURGICALHISTORY     Patient  has a past surgical history that includes Hysterectomy; [place] tooth extraction; Carpal tunnel release; Tubal ligation;  section (,); Colonoscopy (); Upper gastrointestinal endoscopy (2016); and Insertable Cardiac Monitor.    CURRENT MEDICATIONS       Previous Medications    ALBUTEROL (ACCUNEB) 1.25 MG/3ML NEBULIZER SOLUTION    Inhale 3 mLs into the lungs every 6 hours as needed for Wheezing    ASPIRIN LOW DOSE 81 MG EC TABLET    Take 1 tablet by mouth daily    BENAZEPRIL (LOTENSIN) 10 MG TABLET    Take 1 tablet by mouth daily    BENZONATATE (TESSALON PERLES) 100 MG CAPSULE    Take 1 capsule by mouth 3 times daily as needed for Cough    BISACODYL (DULCOLAX) 5 MG EC TABLET    Take 1 tablet by mouth daily as needed for Constipation    CALCIUM CARBONATE 1500 (600 CA) MG TABS TABLET    Take 1 tablet by mouth 2 times daily (with meals)    DOCUSATE SODIUM (COLACE) 100 MG CAPSULE    Take 1 capsule by mouth 2 times daily    FLUOXETINE (PROZAC) 10 MG CAPSULE    take 1 capsule by mouth once daily WITH 20 MG    FLUOXETINE (PROZAC) 20 MG CAPSULE    Take 1 capsule by mouth daily    FLUOXETINE (PROZAC) 40 MG CAPSULE    Take 1 capsule by mouth daily    LORATADINE (CLARITIN) 10 MG  TABLET    Take 1 tablet by mouth daily    MECLIZINE (ANTIVERT) 25 MG TABLET    Take 1 tablet by mouth daily as needed    METOPROLOL SUCCINATE (TOPROL XL) 25 MG EXTENDED RELEASE TABLET    Take 1 tablet by mouth 2 times daily    PLECANATIDE 3 MG TABS    Take 3 mg by mouth every morning (before breakfast)    PROPAFENONE (RYTHMOL) 225 MG TABLET    Take 1 tablet by mouth in the morning and 1 tablet at noon and 1 tablet in the evening.    RA VITAMIN D-3 125 MCG (5000 UT) CAPS CAPSULE    take 1 capsule by mouth once daily WITH A MEAL CONTAINING FAT    ROSUVASTATIN (CRESTOR) 20 MG TABLET    Take 1 tablet by mouth daily    SPIRONOLACTONE (ALDACTONE) 25 MG TABLET    Take 1 tablet by mouth daily       ALLERGIES     Patient is is allergic to penicillins.    Patients   Immunization History   Administered Date(s) Administered    COVID-19, J&J, (age 18y+), IM, 0.5 mL 03/31/2021    Influenza Virus Vaccine 09/29/2019    Pneumococcal, PPSV23, PNEUMOVAX 23, (age 2y+), SC/IM, 0.5mL 09/29/2019       FAMILY HISTORY     Patient's family history includes Heart Disease in her sister; Stroke in her sister.    SOCIAL HISTORY     Patient  reports that she has never smoked. She has never used smokeless tobacco. She reports current alcohol use. She reports that she does not use drugs.    PHYSICAL EXAM     ED TRIAGE VITALS   ,  ,  ,  ,  ,Estimated body mass index is 28.72 kg/m² as calculated from the following:    Height as of 4/5/24: 1.549 m (5' 1\").    Weight as of 4/5/24: 68.9 kg (152 lb).,Patient's last menstrual period was 03/04/2005.    Physical Exam    DIAGNOSTIC RESULTS     Labs:No results found for this visit on 05/13/24.    IMAGING:    XR WRIST LEFT (MIN 3 VIEWS)    (Results Pending)         EKG:      URGENT CARE COURSE:     There were no vitals filed for this visit.    Medications - No data to display         PROCEDURES:  None    FINAL IMPRESSION      No diagnosis found.      DISPOSITION/ PLAN           PATIENT REFERRED TO:  Conor